# Patient Record
Sex: MALE | Race: WHITE | Employment: FULL TIME | URBAN - METROPOLITAN AREA
[De-identification: names, ages, dates, MRNs, and addresses within clinical notes are randomized per-mention and may not be internally consistent; named-entity substitution may affect disease eponyms.]

---

## 2018-01-15 ENCOUNTER — MEDICAL CORRESPONDENCE (OUTPATIENT)
Dept: HEALTH INFORMATION MANAGEMENT | Facility: CLINIC | Age: 28
End: 2018-01-15

## 2018-11-01 ENCOUNTER — TRANSFERRED RECORDS (OUTPATIENT)
Dept: HEALTH INFORMATION MANAGEMENT | Facility: CLINIC | Age: 28
End: 2018-11-01

## 2018-11-15 ENCOUNTER — MEDICAL CORRESPONDENCE (OUTPATIENT)
Dept: HEALTH INFORMATION MANAGEMENT | Facility: CLINIC | Age: 28
End: 2018-11-15

## 2018-12-03 ENCOUNTER — DOCUMENTATION ONLY (OUTPATIENT)
Dept: GASTROENTEROLOGY | Facility: CLINIC | Age: 28
End: 2018-12-03

## 2018-12-03 NOTE — PROGRESS NOTES
GI notes or primary provider notes related to GI problem:   PCP note - [facility]     Pathology reports: N    Recent Lab  Reports: Y    Radiology Reports (CT?MRI) : N    Endoscopy:  N    Colonoscopy: N    Referring GI Physician Name: N/A    Referring PCP Name: Dr. Gaurav Mauricio        Referral Date: 11/26/18 - Hematemesis without nausea     Date Complete Records Received and sent for review: 12/3/18    Date records scanned into epic: 12/3/18    Provider Review Date:     Date review routed back to sender:     Letter sent:       Notes:

## 2018-12-28 ENCOUNTER — PATIENT OUTREACH (OUTPATIENT)
Dept: GASTROENTEROLOGY | Facility: CLINIC | Age: 28
End: 2018-12-28

## 2018-12-28 DIAGNOSIS — K92.0 HEMATEMESIS: Primary | ICD-10-CM

## 2018-12-28 NOTE — PROGRESS NOTES
REFERRAL REVIEW FORM    Reason for referral: hematemesis in the setting of alcohol use    Date records reviewed: 12/27/2018    Previous work up:    Labs    Recommendation:    Schedule for EGD only    When to schedule:  Within 1-2 weeks    Comments: patient with hematemesis in the setting of alcohol use. Recommend expedited EGD. May need office visit pending results.

## 2018-12-28 NOTE — PROGRESS NOTES
hematemesis in the setting of alcohol use  Referral and records reviewed by Dr. Basilia Jones           Comments: patient with hematemesis in the setting of alcohol use. Recommend expedited EGD. May need office visit pending results    Gastroenterology referral for procedure placed.

## 2018-12-28 NOTE — PROGRESS NOTES
Left a message with the plan that records had been reviewed and the next step would be a egd. Briefly described egd and the process for getting this scheduled. Left my contact information if pt had any further questions.

## 2019-09-19 ENCOUNTER — OFFICE VISIT (OUTPATIENT)
Dept: FAMILY MEDICINE | Facility: CLINIC | Age: 29
End: 2019-09-19

## 2019-09-19 VITALS
HEART RATE: 72 BPM | DIASTOLIC BLOOD PRESSURE: 89 MMHG | SYSTOLIC BLOOD PRESSURE: 131 MMHG | OXYGEN SATURATION: 96 % | BODY MASS INDEX: 26.42 KG/M2 | HEIGHT: 71 IN | TEMPERATURE: 98.3 F | WEIGHT: 188.7 LBS

## 2019-09-19 DIAGNOSIS — F43.20 ADJUSTMENT DISORDER, UNSPECIFIED TYPE: Primary | ICD-10-CM

## 2019-09-19 PROCEDURE — 99203 OFFICE O/P NEW LOW 30 MIN: CPT | Performed by: PHYSICIAN ASSISTANT

## 2019-09-19 ASSESSMENT — MIFFLIN-ST. JEOR: SCORE: 1858.07

## 2019-09-19 NOTE — NURSING NOTE
"Chief Complaint   Patient presents with     Establish Care     MOOD CHANGES     /89   Pulse 72   Temp 98.3  F (36.8  C) (Oral)   Ht 1.803 m (5' 11\")   Wt 85.6 kg (188 lb 11.2 oz)   SpO2 96%   BMI 26.32 kg/m   Estimated body mass index is 26.32 kg/m  as calculated from the following:    Height as of this encounter: 1.803 m (5' 11\").    Weight as of this encounter: 85.6 kg (188 lb 11.2 oz).  Medication Reconciliation: complete      Health Maintenance that is potentially due pending provider review:  NONE    n/a    CARYN Barajas  "

## 2019-09-19 NOTE — PROGRESS NOTES
"Subjective     Arsalan Lucas is a 28 year old male who presents to clinic today for the following health issues:    HPI   Abnormal Mood Symptoms      Duration: 10+ years, sx progressively worsening over the past 5 years     Description:  Depression: YES  Anxiety: YES  Panic attacks: no      Accompanying signs and symptoms: see PHQ-9 and VANIA scores - study/eval was done last year and was told that he has schizophrenia     History (similar episodes/previous evaluation): None    Precipitating or alleviating factors: None    Therapies tried and outcome: none      27 y/o NP male here to discuss his mental health.  He has struggled for over 10 years, but the last 5 things have just been getting worse.  This is not something he has sought much care for over this time frame.  Has had well exams with providers and never really mentioned.  He did get evaluted last year, and there was concern for schizophrenia.  No real follow up was done.  At this time, he denies hearing any voices.  He is interested in further evaluation to confirm diagnosis to help with proper treatment.  We discussed his safety, and at this time he does feel safe at home.  Has had thought of hurting self in the past, no previous attempts.      BP Readings from Last 3 Encounters:   09/25/19 131/83   09/19/19 131/89    Wt Readings from Last 3 Encounters:   09/19/19 85.6 kg (188 lb 11.2 oz)                      Reviewed and updated as needed this visit by Provider         Review of Systems   ROS COMP: Constitutional, HEENT, cardiovascular, pulmonary, gi and gu systems are negative, except as otherwise noted.      Objective    /89   Pulse 72   Temp 98.3  F (36.8  C) (Oral)   Ht 1.803 m (5' 11\")   Wt 85.6 kg (188 lb 11.2 oz)   SpO2 96%   BMI 26.32 kg/m    Body mass index is 26.32 kg/m .  Physical Exam   GENERAL: alert and no distress  EYES: Eyes grossly normal to inspection  RESP: lungs clear to auscultation - no rales, rhonchi or wheezes  CV: " "regular rate and rhythm, normal S1 S2, no S3 or S4, no murmur, click or rub, no peripheral edema and peripheral pulses strong  PSYCH: mentation appears normal, affect normal/bright    Diagnostic Test Results:  Labs reviewed in Epic        Assessment & Plan     1. Adjustment disorder, unspecified type  Discussed next steps and he wanted to follow up for psychological testing before initiating treatment.  Discussed that if he starts to have thoughts of hurting self to contact our office or go to ER.  He does understand.  - MENTAL HEALTH REFERRAL  - Adult; Assessments and Testing; General Psychological Testing; G: [facility] [phone]; We will contact you to schedule the appointment or please call with any questions     BMI:   Estimated body mass index is 26.32 kg/m  as calculated from the following:    Height as of this encounter: 1.803 m (5' 11\").    Weight as of this encounter: 85.6 kg (188 lb 11.2 oz).               Return in about 3 months (around 12/19/2019).    David Reyes PA-C  [facility]      "

## 2019-09-25 ENCOUNTER — OFFICE VISIT (OUTPATIENT)
Dept: PSYCHOLOGY | Facility: CLINIC | Age: 29
End: 2019-09-25

## 2019-09-25 ENCOUNTER — TELEPHONE (OUTPATIENT)
Dept: PSYCHOLOGY | Facility: CLINIC | Age: 29
End: 2019-09-25

## 2019-09-25 ENCOUNTER — HOSPITAL ENCOUNTER (EMERGENCY)
Facility: CLINIC | Age: 29
Discharge: HOME | End: 2019-09-25
Attending: FAMILY MEDICINE | Admitting: FAMILY MEDICINE

## 2019-09-25 ENCOUNTER — DOCUMENTATION ONLY (OUTPATIENT)
Dept: PSYCHOLOGY | Facility: CLINIC | Age: 29
End: 2019-09-25

## 2019-09-25 VITALS
OXYGEN SATURATION: 99 % | RESPIRATION RATE: 16 BRPM | HEART RATE: 67 BPM | TEMPERATURE: 98.3 F | DIASTOLIC BLOOD PRESSURE: 83 MMHG | SYSTOLIC BLOOD PRESSURE: 131 MMHG

## 2019-09-25 DIAGNOSIS — F33.1 MAJOR DEPRESSIVE DISORDER, RECURRENT EPISODE, MODERATE (H): ICD-10-CM

## 2019-09-25 DIAGNOSIS — F10.10 ALCOHOL ABUSE, CONTINUOUS: ICD-10-CM

## 2019-09-25 DIAGNOSIS — F41.9 ANXIETY: ICD-10-CM

## 2019-09-25 DIAGNOSIS — F11.21 NARCOTIC DEPENDENCE, IN REMISSION (H): ICD-10-CM

## 2019-09-25 DIAGNOSIS — F32.A DEPRESSION, UNSPECIFIED DEPRESSION TYPE: ICD-10-CM

## 2019-09-25 DIAGNOSIS — F29 SCHIZOPHRENIA SPECTRUM DISORDER WITH PSYCHOTIC DISORDER TYPE NOT YET DETERMINED (H): Primary | ICD-10-CM

## 2019-09-25 LAB
AMPHETAMINES UR QL SCN: NEGATIVE
BARBITURATES UR QL: NEGATIVE
BENZODIAZ UR QL: NEGATIVE
CANNABINOIDS UR QL SCN: NEGATIVE
COCAINE UR QL: NEGATIVE
ETHANOL UR QL SCN: NEGATIVE
OPIATES UR QL SCN: NEGATIVE

## 2019-09-25 PROCEDURE — 99285 EMERGENCY DEPT VISIT HI MDM: CPT | Mod: 25

## 2019-09-25 PROCEDURE — 80307 DRUG TEST PRSMV CHEM ANLYZR: CPT | Performed by: FAMILY MEDICINE

## 2019-09-25 PROCEDURE — 80320 DRUG SCREEN QUANTALCOHOLS: CPT | Performed by: FAMILY MEDICINE

## 2019-09-25 PROCEDURE — 90840 PSYTX CRISIS EA ADDL 30 MIN: CPT | Performed by: SOCIAL WORKER

## 2019-09-25 PROCEDURE — 90791 PSYCH DIAGNOSTIC EVALUATION: CPT

## 2019-09-25 PROCEDURE — 90839 PSYTX CRISIS INITIAL 60 MIN: CPT | Performed by: SOCIAL WORKER

## 2019-09-25 PROCEDURE — 99284 EMERGENCY DEPT VISIT MOD MDM: CPT | Mod: Z6 | Performed by: FAMILY MEDICINE

## 2019-09-25 ASSESSMENT — ANXIETY QUESTIONNAIRES
4. TROUBLE RELAXING: NEARLY EVERY DAY
2. NOT BEING ABLE TO STOP OR CONTROL WORRYING: NEARLY EVERY DAY
5. BEING SO RESTLESS THAT IT IS HARD TO SIT STILL: MORE THAN HALF THE DAYS
6. BECOMING EASILY ANNOYED OR IRRITABLE: SEVERAL DAYS
3. WORRYING TOO MUCH ABOUT DIFFERENT THINGS: MORE THAN HALF THE DAYS
IF YOU CHECKED OFF ANY PROBLEMS ON THIS QUESTIONNAIRE, HOW DIFFICULT HAVE THESE PROBLEMS MADE IT FOR YOU TO DO YOUR WORK, TAKE CARE OF THINGS AT HOME, OR GET ALONG WITH OTHER PEOPLE: SOMEWHAT DIFFICULT
1. FEELING NERVOUS, ANXIOUS, OR ON EDGE: NEARLY EVERY DAY
7. FEELING AFRAID AS IF SOMETHING AWFUL MIGHT HAPPEN: NEARLY EVERY DAY
GAD7 TOTAL SCORE: 17

## 2019-09-25 ASSESSMENT — COLUMBIA-SUICIDE SEVERITY RATING SCALE - C-SSRS
TOTAL  NUMBER OF ABORTED OR SELF INTERRUPTED ATTEMPTS PAST 3 MONTHS: 1
TOTAL  NUMBER OF INTERRUPTED ATTEMPTS LIFETIME: NO
2. HAVE YOU ACTUALLY HAD ANY THOUGHTS OF KILLING YOURSELF?: YES
3. HAVE YOU BEEN THINKING ABOUT HOW YOU MIGHT KILL YOURSELF?: YES
TOTAL  NUMBER OF ACTUAL ATTEMPTS LIFETIME: 3
TOTAL  NUMBER OF ABORTED OR SELF INTERRUPTED ATTEMPTS PAST LIFETIME: YES
5. HAVE YOU STARTED TO WORK OUT OR WORKED OUT THE DETAILS OF HOW TO KILL YOURSELF? DO YOU INTEND TO CARRY OUT THIS PLAN?: YES
TOTAL  NUMBER OF ABORTED OR SELF INTERRUPTED ATTEMPTS PAST 3 MONTHS: YES
TOTAL  NUMBER OF INTERRUPTED ATTEMPTS PAST 3 MONTHS: NO
1. IN THE PAST MONTH, HAVE YOU WISHED YOU WERE DEAD OR WISHED YOU COULD GO TO SLEEP AND NOT WAKE UP?: YES
4. HAVE YOU HAD THESE THOUGHTS AND HAD SOME INTENTION OF ACTING ON THEM?: YES
5. HAVE YOU STARTED TO WORK OUT OR WORKED OUT THE DETAILS OF HOW TO KILL YOURSELF? DO YOU INTEND TO CARRY OUT THIS PLAN?: YES
6. HAVE YOU EVER DONE ANYTHING, STARTED TO DO ANYTHING, OR PREPARED TO DO ANYTHING TO END YOUR LIFE?: NO
ATTEMPT LIFETIME: YES
6. HAVE YOU EVER DONE ANYTHING, STARTED TO DO ANYTHING, OR PREPARED TO DO ANYTHING TO END YOUR LIFE?: NO
2. HAVE YOU ACTUALLY HAD ANY THOUGHTS OF KILLING YOURSELF LIFETIME?: YES
ATTEMPT PAST THREE MONTHS: NO
4. HAVE YOU HAD THESE THOUGHTS AND HAD SOME INTENTION OF ACTING ON THEM?: YES
1. IN THE PAST MONTH, HAVE YOU WISHED YOU WERE DEAD OR WISHED YOU COULD GO TO SLEEP AND NOT WAKE UP?: YES

## 2019-09-25 ASSESSMENT — ENCOUNTER SYMPTOMS
DYSPHORIC MOOD: 1
SHORTNESS OF BREATH: 0
ABDOMINAL PAIN: 0
FEVER: 0
NERVOUS/ANXIOUS: 1

## 2019-09-25 ASSESSMENT — PATIENT HEALTH QUESTIONNAIRE - PHQ9: SUM OF ALL RESPONSES TO PHQ QUESTIONS 1-9: 22

## 2019-09-25 NOTE — PROGRESS NOTES
"                  Progress Note - Initial Session    Client Name:  Arsalan Lucas Date: 9/25/19         Service Type: Individual  Video Visit: No     Session Start Time: 9:00 AM  Session End Time: 10:20 AM     Session Length: 1 hour 20 minutes    Session #: 1    Attendees: Client attended alone     DATA:  Diagnostic Assessment in progress.  Unable to complete documentation at the conclusion of the first session due to suicidal ideation, crisis    Interactive Complexity: No  Crisis: Yes, visit entailed Crisis Management / Stabilization requiring urgent assessment and history of the crisis state, mental status exam and disposition  -Presenting problem with life threatening or complex issues that required immediate attention to a patient in high distress    Intervention:  Assessment, Crisis Management  Met with client and went through PHQ-9, VANIA-7, DSM5 screeners, and [place].  Details for PHQ-9, VANIA-7, and [place] can be found in flowsheets.  Client screened positive for further assessment on DSM 5 screener tools for: depression, anger, giulia, anxiety, suicidal ideation, psychosis, sleep problems, repetitive thoughts or behaviors, dissociation, and personality functioning.  DSM 5 level 2 screeners were completed for:  -Depression, raw score of 32, t-score of 62.6, moderate  -Anger, raw score of 11, t-score of 50.5, none to slight  -Giulia, raw score of 9, high probability of manic or hypomanic condition  -Anxiety, raw score of 30, t-score of 72,9, severe  -Repetitive Thoughts and Behaviors, raw score of 13, average score 2.6, severe to extreme  -Sleep Problems, raw score of 32, t-score of 62.6, moderate    Due to high [place] Score, persistent thoughts of wanting to hurt himself, command voices telling him to kill himself, a plan, and few deterrants, a transport hold was placed and client was transported to [facility].    Client's plan to hurt himself was to use his \"many sharp objects,\" sharing that he has " a collection of knives and swords, as well as kitchen knives.  His plan was to say he was going to work, but actually drive into the middle of nowhere and slit his wrists. His only deterrant was his cat. Suicidal ideation had been intensifying.    Of note, client shared that he has had paranoia and voices for many years.  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  Symptoms have intensified since becoming sober.      ASSESSMENT:  Mental Status Assessment:  Appearance:   Appropriate  hat on, davis up entire encounter   Eye Contact:   Fair   Psychomotor Behavior: Normal   Attitude:   Cooperative   Orientation:   All  Speech   Rate / Production: Normal    Volume:  Normal   Mood:    Anxious   Affect:    Restricted   Thought Content:  Clear  Hallucinations  Paranoia   Thought Form:  Logical  Frequently lost track of question and would stare into space  Insight:    Fair       Safety Issues and Plan for Safety and Risk Management:    Client denies current or recent homicidal ideation or behaviors.  Client denies other safety concerns.  A safety and risk management plan has been developed including: Patient consented to co-developed safety plan.  Safety and risk management plan was completed.  Patient agreed to use safety plan should any safety concerns arise.  A copy was given to the patient.  Patient was placed on transportation hold and transferred to ED.  Client reports there are no firearms in the house.  Client noted he did not trust himself around firearms.      Diagnostic Criteria:  A. Characteristic symptoms: Two (or more) of the following, each present for a significant portion of time during a 1-month period (or less if successfully treated)*:    - Hallucinations  C. Duration: Continuous signs of the disturbance persist for at least 6 months. This 6-month period must include at least 1 month of symptoms  (or less if successfully treated) that meet Criterion A (ie, active-phase symptoms) and may include periods of prodromal or residual symptoms. During these prodromal or residual periods, the signs of the disturbance may be manifested by only negative symptoms or two or more symptoms listed in Criterion A present in an attenuated form (eg, odd beliefs, unusual perceptual experiences).      DSM5 Diagnoses: (Sustained by DSM5 Criteria Listed Above)  Diagnoses: 298.9 (F29)  Unspecified Schizophrenia Spectrum  Psychosocial & Contextual Factors: Client is currently struggling in his current relationship with his girlfriend, whom he lives with.  WHODAS 2.0 (12 item)            This questionnaire asks about difficulties due to health conditions. Health conditions  include  disease or illnesses, other health problems that may be short or long lasting,  injuries, mental health or emotional problems, and problems with alcohol or drugs.                     Think back over the past 30 days and answer these questions, thinking about how much  difficulty you had doing the following activities. For each question, please Hughes only  one response.    S1 Standing for long periods such as 30 minutes? Mild =           2   S2 Taking care of household responsibilities? Moderate =   3   S3 Learning a new task, for example, learning how to get to a new place? Mild =           2   S4 How much of a problem do you have joining community activities (for example, festivals, Rastafari or other activities) in the same way as anyone else can? Extreme / or cannot do = 5   S5 How much have you been emotionally affected by your health problems? Moderate =   3     In the past 30 days, how much difficulty did you have in:   S6 Concentrating on doing something for ten minutes? Moderate =   3   S7 Walking a long distance such as a kilometer (or equivalent)? None =         1   S8 Washing your whole body? Moderate =   3   S9 Getting dressed? Mild =          "  2   S10 Dealing with people you do not know? Extreme / or cannot do = 5   S11 Maintaining a friendship? Severe =       4   S12 Your day to day work? Severe =       4     H1 Overall, in the past 30 days, how many days were these difficulties present? Record number of days 5   H2 In the past 30 days, for how many days were you totally unable to carry out your usual activities or work because of any health condition? Record number of days  10   H3 In the past 30 days, not counting the days that you were totally unable, for how many days did you cut back or reduce your usual activities or work because of any health condition? Record number of days 6       Collateral Reports Completed:  Routed note to PCP      PLAN: (Homework, other):  Client to be evaluated at ER.    Client stated that he will follow up for ongoing services with [facility] and made a follow up appointment, knowing this may be cancelled if he ends up in a higher level of care.        IQRA MUNSON     SAFETY PLAN:  Step 1: Warning signs / cues (Thoughts, images, mood, situation, behavior) that a crisis may be developing:    Thoughts: \"I'm a burden\"    Images: none    Thinking Processes: ruminations (can't stop thinking about my problems): focusing on whatever has been my fault recently, racing thoughts, intrusive thoughts (bothersome, unwanted thoughts that come out of nowhere): (not always bad), highly critical and negative thoughts: repeatedly telling myself I'm not great in some way or another, disorganized thinking: \"Kory stories\" completely irrelevant to the topic at hand and paranoia: other people, I don't trust a lot of people    Mood: wortheless, annoyance    Behaviors: isolating/withdrawing  and not sleeping enough    Situations: relationship problems   Step 2: Coping strategies - Things I can do to take my mind off of my problems without contacting " "another person (relaxation technique, physical activity):    Distress Tolerance Strategies:  relaxation activities: metal vocals, video games, arts and crafts: writing music, writing stories, play with my pet , sensory based activities/self-soothe with five senses: go into the rain, watch a funny movie: any and read a book: comics    Physical Activities: go for a walk    Focus on helpful thoughts:  \"This is temporary\" and remind myself of what is important to me: cat, focus on doing a tour with my friend  Step 3: People and social settings that provide distraction:   None identified   Name:  Phone:    Name:  Phone:    Name:  Phone:     [employer] and library   Step 4: Remind myself of people and things that are important to me and worth living for:  Cat, mother and little sister      Step 5: When I am in crisis, I can ask these people to help me use my safety plan:   Name: Mother Phone: In phone  Step 6: Making the environment safe:     remove things I could use to hurt myself: removing knives  Step 7: Professionals or agencies I can contact during a crisis:    [facility] Daytime Number: [phone]    Suicide Prevention Lifeline: 0-121-513-TALK (8280)    Crisis Text Line Service (available 24 hours a day, 7 days a week): Text [place] to 501173  Local Crisis Services: Adults, 18 and older  [facility] [phone]    Call 911 or go to my nearest emergency department.   I helped develop this safety plan and agree to use it when needed.  I have been given a copy of this plan.      Client signature _________________________________________________________________  Today s date:  9/25/2019  Adapted from Safety Plan Template 2008 Gloria Crawford and Juan A Marinelli is reprinted with the express permission of the authors.  No portion of the Safety Plan Template may be reproduced without the express, written permission.  You can contact the authors at [email or web address] or [email or web address].          "

## 2019-09-25 NOTE — TELEPHONE ENCOUNTER
Reason for Call:  Other call back    Detailed comments: Patients girlfriend is requesting a call back ASAP. States if she doesn't hear back she will 'be coming up there'. States the patient is NOT suicidal and she trusted he would get help but now is scared. She is demanding to speak with someone about this right away. There is no consent to communicate on file but states she would like to relay information. Please follow up. Girlfriend was getting loud and irate over the phone, writer had to end the call. Thanks!    Phone Number Patient can be reached at: [phone]    Best Time: Any    Can we leave a detailed message on this number? YES    Call taken on 9/25/2019 at 12:55 PM by Lilian Head

## 2019-09-25 NOTE — PATIENT INSTRUCTIONS
"Arsalan Lucas     SAFETY PLAN:  Step 1: Warning signs / cues (Thoughts, images, mood, situation, behavior) that a crisis may be developing:    Thoughts: \"I'm a burden\"    Images: none    Thinking Processes: ruminations (can't stop thinking about my problems): focusing on whatever has been my fault recently, racing thoughts, intrusive thoughts (bothersome, unwanted thoughts that come out of nowhere): (not always bad), highly critical and negative thoughts: repeatedly telling myself I'm not great in some way or another, disorganized thinking: \"Kory stories\" completely irrelevant to the topic at hand and paranoia: other people, I don't trust a lot of people    Mood: wortheless, annoyance    Behaviors: isolating/withdrawing  and not sleeping enough    Situations: relationship problems   Step 2: Coping strategies - Things I can do to take my mind off of my problems without contacting another person (relaxation technique, physical activity):    Distress Tolerance Strategies:  relaxation activities: metal vocals, video games, arts and crafts: writing music, writing stories, play with my pet , sensory based activities/self-soothe with five senses: go into the rain, watch a funny movie: any and read a book: comics    Physical Activities: go for a walk    Focus on helpful thoughts:  \"This is temporary\" and remind myself of what is important to me: cat, focus on doing a tour with my friend  Step 3: People and social settings that provide distraction:   None identified   Name:  Phone:    Name:  Phone:    Name:  Phone:     coffee shop and library   Step 4: Remind myself of people and things that are important to me and worth living for:  Cat, mother and little sister      Step 5: When I am in crisis, I can ask these people to help me use my safety plan:   Name: Mother Phone: In phone  Step 6: Making the environment safe:     remove things I could use to hurt myself: removing " emir  Step 7: Professionals or agencies I can contact during a crisis:    [facility] Daytime Number: [phone]    Suicide Prevention Lifeline: 1-104-209-DEGP (2520)    Crisis Text Line Service (available 24 hours a day, 7 days a week): Text [place] to 651718  Local Crisis Services: Adults, 18 and older  [place] - [phone]    Call 911 or go to my nearest emergency department.   I helped develop this safety plan and agree to use it when needed.  I have been given a copy of this plan.      Client signature _________________________________________________________________  Today s date:  9/25/2019  Adapted from Safety Plan Template 2008 Gloria Crawford and Juan A Marinelli is reprinted with the express permission of the authors.  No portion of the Safety Plan Template may be reproduced without the express, written permission.  You can contact the authors at [email or web address] or [email or web address].

## 2019-09-25 NOTE — Clinical Note
Milvia Reyes,I wanted to let you know that Kory showed up today for assessment and to start therapy.  Due to his level of suicidal ideation, I sent him to the ER.  I will let you know if I learn of any other relevant information.Thank you,MYLENE Rayo, [email or web address]

## 2019-09-25 NOTE — ED AVS SNAPSHOT
[facility], [place], Emergency Department  [address]  [place] [address]  Phone:  [phone]  Fax:  [phone]                                    Arsalan Lucas   MRN: [id number]    Department:  [facility], Emergency Department   Date of Visit:  9/25/2019           After Visit Summary Signature Page    I have received my discharge instructions, and my questions have been answered. I have discussed any challenges I see with this plan with the nurse or doctor.    ..........................................................................................................................................  Patient/Patient Representative Signature      ..........................................................................................................................................  Patient Representative Print Name and Relationship to Patient    ..................................................               ................................................  Date                                   Time    ..........................................................................................................................................  Reviewed by Signature/Title    ...................................................              ..............................................  Date                                               Time          22EPIC Rev 08/18

## 2019-09-25 NOTE — ED NOTES
Bed: HW01  Expected date: 9/25/19  Expected time: 10:34 AM  Means of arrival: Ambulance  Comments:  H 434  28y M SI, auditory hallucinations

## 2019-09-25 NOTE — ED PROVIDER NOTES
"  History     Chief Complaint   Patient presents with     Suicidal     pt brought in by EMS from psych clinic due to SI with a plan to \"go anywhere with something sharp\"     HPI  Arsalan Lucas is a 28 year old male who presents to the emergency room from his therapy appointment.  Patient was seen at [facility] today for the first therapy appointment during that interaction patient had made statements that he is having increased thoughts of wanting to cut himself and there was some concern about whether he had access to knives and sorts at home patient states that he has chronically had these thoughts of harming himself and has chronically had thoughts of suicide but states that he is not intending on acting on these and was seeking therapy today in order to deal with some of these thoughts.  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  Patient at this time denies any hallucinations and states that he does not feel as though he is actively suicidal at this moment.  Patient does note that he has had some decrease in appetite and some difficulty with sleep but feels as though he is trying to seek therapy to help deal with some of these issues.    I have reviewed the Medications, Allergies, Past Medical and Surgical History, and Social History in the Epic system.    PERSONAL MEDICAL HISTORY  History reviewed. No pertinent past medical history.  PAST SURGICAL HISTORY  History reviewed. No pertinent surgical history.  FAMILY HISTORY  History reviewed. No pertinent family history.  SOCIAL HISTORY  Social History     Tobacco Use     Smoking status: Former Smoker     Smokeless tobacco: Never Used     Tobacco comment: Quit 2017   Substance Use Topics     Alcohol use: Not Currently     MEDICATIONS  No current facility-administered medications for this " encounter.      No current outpatient medications on file.     ALLERGIES  No Known Allergies      Review of Systems   Constitutional: Negative for fever.   Respiratory: Negative for shortness of breath.    Cardiovascular: Negative for chest pain.   Gastrointestinal: Negative for abdominal pain.   Psychiatric/Behavioral: Positive for dysphoric mood. The patient is nervous/anxious.    All other systems reviewed and are negative.      Physical Exam   BP: (!) 126/97  Pulse: 67  Heart Rate: 88  Temp: 98.8  F (37.1  C)  Resp: 16  SpO2: 97 %      Physical Exam  Constitutional:       General: He is not in acute distress.     Appearance: He is not diaphoretic.   HENT:      Head: Atraumatic.   Eyes:      General: No scleral icterus.     Pupils: Pupils are equal, round, and reactive to light.   Cardiovascular:      Heart sounds: Normal heart sounds.   Pulmonary:      Effort: No respiratory distress.      Breath sounds: Normal breath sounds.   Abdominal:      General: Bowel sounds are normal.      Palpations: Abdomen is soft.      Tenderness: There is no tenderness.   Musculoskeletal:         General: No tenderness.   Skin:     General: Skin is warm.      Findings: No rash.   Neurological:      General: No focal deficit present.      Mental Status: He is oriented to person, place, and time.      Motor: No weakness.      Coordination: Coordination normal.   Psychiatric:         Mood and Affect: Mood is anxious.         Thought Content: Thought content does not include suicidal ideation.         ED Course        Procedures    Patient was seen and evaluated by the  please refer to their documentation in the note section of the epic chart dated 9/25/2019    Critical Care time:  none             Labs Ordered and Resulted from Time of ED Arrival Up to the Time of Departure from the ED   DRUG ABUSE SCREEN 6 CHEM DEP URINE ([facility])            Assessments & Plan (with Medical Decision Making)       I have reviewed the nursing  notes.    I have reviewed the findings, diagnosis, plan and need for follow up with the patient.    Patient with underlying depression and anxiety presenting after an interaction at his therapist's office in which he had expressed increased thoughts of wanting to injure himself patient is adamant about the fact that he is not currently suicidal and states that he does not feel as he is at increased risk we were able to get collateral information from his significant other who also feels as though he is not unsafe at this time he is interested in continue with outpatient therapy we discussed the case with the therapist that referred him here and she understands the patient will be discharged home with continued outpatient therapy patient will return to the emergency room if he does have any increased thoughts of harming himself.    Final diagnoses:   Depression, unspecified depression type   Anxiety       9/25/2019   [facility], [place], EMERGENCY DEPARTMENT     Jarred Camacho MD  09/25/19 [id number]

## 2019-09-25 NOTE — ED NOTES
Patient arrives to [facility]. Psych Associate explains process and gives patient urine cup. Patient told about meeting with Mental Health  and Psychiatrist. Patient told about 2-5 hour time frame for complete evaluation. Patient offered fluids, nutrition, and comfort measures. Patient told about continuous video observation in room.

## 2019-09-25 NOTE — DISCHARGE INSTRUCTIONS
Discharge to home with plans to follow-up with your therapist as well as the psychiatric provider as discussed.  Return if any increased risk of harming self.

## 2019-09-26 ENCOUNTER — TELEPHONE (OUTPATIENT)
Dept: PSYCHOLOGY | Facility: CLINIC | Age: 29
End: 2019-09-26

## 2019-09-26 ENCOUNTER — FCC EXTENDED DOCUMENTATION (OUTPATIENT)
Dept: PSYCHOLOGY | Facility: CLINIC | Age: 29
End: 2019-09-26

## 2019-09-26 ASSESSMENT — ANXIETY QUESTIONNAIRES: GAD7 TOTAL SCORE: 17

## 2019-09-26 NOTE — PROGRESS NOTES
Case Consultation Record       Client Name: Arsalan Lucas   Date:  9/25/19    Diagnosis: No diagnosis found.    Therapist: Kaila Chowdary, MYLEN[facility], [facility]      Team Members Present:  Jae Regalado, Cody Dejesus, James Johnson, Jenelle Cui, Mamta Arroyo, Kristen Rm,     Purpose:   Risk Management    Recommendations:  Shared about client's level of risk due to: suicidal ideation with a specific plan, access to materials needed for his plan, hearing command hallucinations directing him to kill himself, and his cat as his only deterrent for not following through with his plan.  Additionally, had a history of multiple aborted attempts.  Shared that he had been put on a transport hold and was currently being assessed in ER.  Discussed whether or not client would be appropriate to continue with as he had a history of having a schizophrenia diagnosis which is outside clinician's current competency. Team shared some possible referrals, but also that could continue with client as he requested it, if he was brought up regularly in consult.
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
## 2019-09-26 NOTE — TELEPHONE ENCOUNTER
Jordan came to clinic and was waiting to meet with this writer.  No release is on file, but Jordan is listed as the emergency contact.  Met with Jordan with Radha Dillard, clinic manager, due to previous phone calls to staff at clinic where Joradn was yelling at staff.  Let Jordan know that it is writer's job to assess level of care and get appropriate care for clients.  Let Jordan know that the hospital had been trying to contact her, and provided their phone number and address.  Provided Jordan with writer's name and supervisor's name.

## 2019-10-02 ENCOUNTER — TELEPHONE (OUTPATIENT)
Dept: PSYCHOLOGY | Facility: CLINIC | Age: 29
End: 2019-10-02

## 2019-10-02 NOTE — TELEPHONE ENCOUNTER
Client did not arrive for appointment today.  Left a message with [facility] appointment phone number if client would like to reschedule the appointment.  Reminded mother about no-show policy. Encouraged this client to reschedule with me or another provider if he would prefer.

## 2019-11-04 ENCOUNTER — FCC EXTENDED DOCUMENTATION (OUTPATIENT)
Dept: PSYCHOLOGY | Facility: CLINIC | Age: 29
End: 2019-11-04

## 2019-11-04 NOTE — PROGRESS NOTES
"                    Discharge Summary  Single Session    Client Name: Arsalan Lucas MRN#: [id number] YOB: 1990      Intake / Discharge Date: 9/25/19 to 11/4/19      DSM5 Diagnoses: (Sustained by DSM5 Criteria Listed Above)  Diagnoses: 311 (F32.9) Unspecified Depressive Disorder   Psychosocial & Contextual Factors: Client is new to the [facility]  WHODAS 2.0 (12 item) Score: 37          Presenting Concern:  Client reports the reason for seeking therapy at this time as \"high anxiety, depression, paranoia.\"      Reason for Discharge:  Client did not return      Disposition at Time of Last Encounter:   Comments:   Client agreed to return to session and scheduled up a follow up appointment while waiting for ambulance to transport him to the hospital.     Risk Management:   Client has had a history of suicidal ideation: ongoing, detailed in safety plan on 9/25/19 and suicide attempts: yes, occured when started using opiates about five years prior  A safety and risk management plan has been developed including: Patient consented to co-developed safety plan.  Safety and risk management plan was completed.  Patient agreed to use safety plan should any safety concerns arise.  A copy was given to the patient.  Patient was placed on transportation hold and transferred to ED.      Referred To:  RACIEL and IQRA Albrecht   11/4/2019  "